# Patient Record
Sex: FEMALE | Race: WHITE | NOT HISPANIC OR LATINO | URBAN - METROPOLITAN AREA
[De-identification: names, ages, dates, MRNs, and addresses within clinical notes are randomized per-mention and may not be internally consistent; named-entity substitution may affect disease eponyms.]

---

## 2018-09-18 NOTE — PATIENT DISCUSSION
POSTERIOR VITREOUS DETACHMENT, OS: NO RETINAL HOLES OR TEARS ON SLIT LAMP AND INDIRECT OPHTHALMOSCOPY. RD PRECAUTIONS DISCUSSED. CALLBACK INSTRUCTIONS GIVEN. RETURN FOR FOLLOW-UP AS SCHEDULED.

## 2018-09-18 NOTE — PATIENT DISCUSSION
CATARACTS, OU - VISUALLY SIGNIFICANT. PT TO CALL WHEN READY TO PROCEED WITH SURGERY. SCHEDUL IF VISUAL SYMPTOMS PERSIST.

## 2022-07-27 ENCOUNTER — ESTABLISHED PATIENT (OUTPATIENT)
Facility: LOCATION | Age: 87
End: 2022-07-27

## 2022-07-27 DIAGNOSIS — Z96.1: ICD-10-CM

## 2022-07-27 DIAGNOSIS — Z98.890: ICD-10-CM

## 2022-07-27 DIAGNOSIS — H52.223: ICD-10-CM

## 2022-07-27 PROCEDURE — 92025 CPTRIZED CORNEAL TOPOGRAPHY: CPT

## 2022-07-27 PROCEDURE — 99213 OFFICE O/P EST LOW 20 MIN: CPT

## 2022-07-27 ASSESSMENT — VISUAL ACUITY
OU_CC: 20/20-1
OD_CC: 20/20-2
OU_SC: 20/30
OD_SC: 20/40
OS_SC: 20/40
OS_CC: 20/30-2

## 2023-07-25 ENCOUNTER — ESTABLISHED PATIENT (OUTPATIENT)
Facility: LOCATION | Age: 88
End: 2023-07-25

## 2023-07-25 DIAGNOSIS — Z98.890: ICD-10-CM

## 2023-07-25 DIAGNOSIS — H52.223: ICD-10-CM

## 2023-07-25 DIAGNOSIS — H04.123: ICD-10-CM

## 2023-07-25 DIAGNOSIS — Z96.1: ICD-10-CM

## 2023-07-25 PROCEDURE — 92015KEC REFRACTION KEC

## 2023-07-25 PROCEDURE — 92014 COMPRE OPH EXAM EST PT 1/>: CPT

## 2023-07-25 ASSESSMENT — TONOMETRY
OD_IOP_MMHG: 9
OS_IOP_MMHG: 14

## 2023-07-25 ASSESSMENT — VISUAL ACUITY
OS_CC: J3
OS_CC: 20/30
OD_CC: J3-2
OD_CC: 20/30

## 2024-10-24 ENCOUNTER — COMPREHENSIVE EXAM (OUTPATIENT)
Facility: LOCATION | Age: 89
End: 2024-10-24

## 2024-10-24 DIAGNOSIS — Z98.890: ICD-10-CM

## 2024-10-24 DIAGNOSIS — H04.123: ICD-10-CM

## 2024-10-24 DIAGNOSIS — H52.4: ICD-10-CM

## 2024-10-24 DIAGNOSIS — Z96.1: ICD-10-CM

## 2024-10-24 PROCEDURE — 92014 COMPRE OPH EXAM EST PT 1/>: CPT

## 2024-10-24 PROCEDURE — 92015KEC REFRACTION KEC
